# Patient Record
Sex: MALE | Race: WHITE | HISPANIC OR LATINO | Employment: FULL TIME | URBAN - NONMETROPOLITAN AREA
[De-identification: names, ages, dates, MRNs, and addresses within clinical notes are randomized per-mention and may not be internally consistent; named-entity substitution may affect disease eponyms.]

---

## 2023-11-21 ENCOUNTER — PREP FOR PROCEDURE (OUTPATIENT)
Dept: CARDIOLOGY | Facility: CLINIC | Age: 63
End: 2023-11-21
Payer: COMMERCIAL

## 2023-11-21 ENCOUNTER — APPOINTMENT (OUTPATIENT)
Dept: RADIOLOGY | Facility: HOSPITAL | Age: 63
DRG: 281 | End: 2023-11-21
Payer: COMMERCIAL

## 2023-11-21 ENCOUNTER — HOSPITAL ENCOUNTER (INPATIENT)
Facility: HOSPITAL | Age: 63
LOS: 1 days | Discharge: HOSPICE | DRG: 281 | End: 2023-11-22
Attending: STUDENT IN AN ORGANIZED HEALTH CARE EDUCATION/TRAINING PROGRAM | Admitting: INTERNAL MEDICINE
Payer: COMMERCIAL

## 2023-11-21 ENCOUNTER — DOCUMENTATION (OUTPATIENT)
Dept: CARDIOLOGY | Facility: CLINIC | Age: 63
End: 2023-11-21
Payer: COMMERCIAL

## 2023-11-21 DIAGNOSIS — I46.9 CARDIAC ARREST (MULTI): ICD-10-CM

## 2023-11-21 DIAGNOSIS — I21.3 ST ELEVATION MYOCARDIAL INFARCTION (STEMI), UNSPECIFIED ARTERY (MULTI): ICD-10-CM

## 2023-11-21 DIAGNOSIS — M62.81 MUSCLE WEAKNESS (GENERALIZED): ICD-10-CM

## 2023-11-21 DIAGNOSIS — I21.3 STEMI (ST ELEVATION MYOCARDIAL INFARCTION) (MULTI): Primary | ICD-10-CM

## 2023-11-21 LAB
ALBUMIN SERPL BCP-MCNC: 3.3 G/DL (ref 3.4–5)
ALP SERPL-CCNC: 115 U/L (ref 33–136)
ALT SERPL W P-5'-P-CCNC: 42 U/L (ref 10–52)
ANION GAP BLDA CALCULATED.4IONS-SCNC: 7 MMO/L (ref 10–25)
ANION GAP SERPL CALC-SCNC: 15 MMOL/L (ref 10–20)
APPARATUS: ABNORMAL
AST SERPL W P-5'-P-CCNC: 61 U/L (ref 9–39)
BASE EXCESS BLDA CALC-SCNC: 3.8 MMOL/L (ref -2–3)
BASE EXCESS BLDV CALC-SCNC: 2.7 MMOL/L (ref -2–3)
BASOPHILS # BLD AUTO: 0.04 X10*3/UL (ref 0–0.1)
BASOPHILS NFR BLD AUTO: 0.4 %
BILIRUB SERPL-MCNC: 1 MG/DL (ref 0–1.2)
BODY TEMPERATURE: ABNORMAL
BODY TEMPERATURE: ABNORMAL
BUN SERPL-MCNC: 30 MG/DL (ref 6–23)
CA-I BLDA-SCNC: 1.11 MMOL/L (ref 1.1–1.33)
CALCIUM SERPL-MCNC: 8.7 MG/DL (ref 8.6–10.3)
CARDIAC TROPONIN I PNL SERPL HS: 59 NG/L (ref 0–20)
CHLORIDE BLDA-SCNC: 99 MMOL/L (ref 98–107)
CHLORIDE SERPL-SCNC: 95 MMOL/L (ref 98–107)
CO2 SERPL-SCNC: 27 MMOL/L (ref 21–32)
CREAT SERPL-MCNC: 2.19 MG/DL (ref 0.5–1.3)
CRITICAL CALL TIME: 2208
CRITICAL CALLED BY: ABNORMAL
CRITICAL CALLED TO: ABNORMAL
CRITICAL READ BACK: ABNORMAL
EOSINOPHIL # BLD AUTO: 0.06 X10*3/UL (ref 0–0.7)
EOSINOPHIL NFR BLD AUTO: 0.7 %
ERYTHROCYTE [DISTWIDTH] IN BLOOD BY AUTOMATED COUNT: 18.5 % (ref 11.5–14.5)
FLOW: 6 LPM
GFR SERPL CREATININE-BSD FRML MDRD: 33 ML/MIN/1.73M*2
GLUCOSE BLD MANUAL STRIP-MCNC: 214 MG/DL (ref 74–99)
GLUCOSE BLDA-MCNC: 227 MG/DL (ref 74–99)
GLUCOSE SERPL-MCNC: 208 MG/DL (ref 74–99)
HCO3 BLDA-SCNC: 26.5 MMOL/L (ref 22–26)
HCO3 BLDV-SCNC: 27.9 MMOL/L (ref 22–26)
HCT VFR BLD AUTO: 27.9 % (ref 41–52)
HCT VFR BLD EST: 23 % (ref 41–52)
HGB BLD-MCNC: 7.8 G/DL (ref 13.5–17.5)
HGB BLDA-MCNC: 7.6 G/DL (ref 13.5–17.5)
IMM GRANULOCYTES # BLD AUTO: 0.08 X10*3/UL (ref 0–0.7)
IMM GRANULOCYTES NFR BLD AUTO: 0.9 % (ref 0–0.9)
INHALED O2 CONCENTRATION: 21 %
INR PPP: 1.1 (ref 0.9–1.1)
LACTATE BLDA-SCNC: 3.9 MMOL/L (ref 0.4–2)
LYMPHOCYTES # BLD AUTO: 2.42 X10*3/UL (ref 1.2–4.8)
LYMPHOCYTES NFR BLD AUTO: 26.7 %
MAGNESIUM SERPL-MCNC: 2.37 MG/DL (ref 1.6–2.4)
MCH RBC QN AUTO: 19.9 PG (ref 26–34)
MCHC RBC AUTO-ENTMCNC: 28 G/DL (ref 32–36)
MCV RBC AUTO: 71 FL (ref 80–100)
MONOCYTES # BLD AUTO: 1.27 X10*3/UL (ref 0.1–1)
MONOCYTES NFR BLD AUTO: 14 %
NEUTROPHILS # BLD AUTO: 5.21 X10*3/UL (ref 1.2–7.7)
NEUTROPHILS NFR BLD AUTO: 57.3 %
NRBC BLD-RTO: 0 /100 WBCS (ref 0–0)
OXYHGB MFR BLDA: 97.4 % (ref 94–98)
OXYHGB MFR BLDV: 63.7 % (ref 45–75)
PCO2 BLDA: 31 MM HG (ref 38–42)
PCO2 BLDV: 44 MM HG (ref 41–51)
PH BLDA: 7.54 PH (ref 7.38–7.42)
PH BLDV: 7.41 PH (ref 7.33–7.43)
PLATELET # BLD AUTO: 198 X10*3/UL (ref 150–450)
PO2 BLDA: 137 MM HG (ref 85–95)
PO2 BLDV: 44 MM HG (ref 35–45)
POTASSIUM BLDA-SCNC: 2.8 MMOL/L (ref 3.5–5.3)
POTASSIUM SERPL-SCNC: 2.8 MMOL/L (ref 3.5–5.3)
PROT SERPL-MCNC: 6.2 G/DL (ref 6.4–8.2)
PROTHROMBIN TIME: 12.6 SECONDS (ref 9.8–12.8)
RBC # BLD AUTO: 3.91 X10*6/UL (ref 4.5–5.9)
SAO2 % BLDA: 100 % (ref 94–100)
SAO2 % BLDV: 66 % (ref 45–75)
SODIUM BLDA-SCNC: 130 MMOL/L (ref 136–145)
SODIUM SERPL-SCNC: 134 MMOL/L (ref 136–145)
TEST COMMENT: ABNORMAL
WBC # BLD AUTO: 9.1 X10*3/UL (ref 4.4–11.3)

## 2023-11-21 PROCEDURE — 84132 ASSAY OF SERUM POTASSIUM: CPT

## 2023-11-21 PROCEDURE — 99153 MOD SED SAME PHYS/QHP EA: CPT | Performed by: STUDENT IN AN ORGANIZED HEALTH CARE EDUCATION/TRAINING PROGRAM

## 2023-11-21 PROCEDURE — 2720000007 HC OR 272 NO HCPCS: Performed by: STUDENT IN AN ORGANIZED HEALTH CARE EDUCATION/TRAINING PROGRAM

## 2023-11-21 PROCEDURE — C1887 CATHETER, GUIDING: HCPCS | Performed by: STUDENT IN AN ORGANIZED HEALTH CARE EDUCATION/TRAINING PROGRAM

## 2023-11-21 PROCEDURE — 2550000001 HC RX 255 CONTRASTS

## 2023-11-21 PROCEDURE — 99285 EMERGENCY DEPT VISIT HI MDM: CPT | Mod: 25,27 | Performed by: STUDENT IN AN ORGANIZED HEALTH CARE EDUCATION/TRAINING PROGRAM

## 2023-11-21 PROCEDURE — 99285 EMERGENCY DEPT VISIT HI MDM: CPT | Performed by: STUDENT IN AN ORGANIZED HEALTH CARE EDUCATION/TRAINING PROGRAM

## 2023-11-21 PROCEDURE — 94760 N-INVAS EAR/PLS OXIMETRY 1: CPT

## 2023-11-21 PROCEDURE — 93460 R&L HRT ART/VENTRICLE ANGIO: CPT | Performed by: STUDENT IN AN ORGANIZED HEALTH CARE EDUCATION/TRAINING PROGRAM

## 2023-11-21 PROCEDURE — 85025 COMPLETE CBC W/AUTO DIFF WBC: CPT

## 2023-11-21 PROCEDURE — 2500000004 HC RX 250 GENERAL PHARMACY W/ HCPCS (ALT 636 FOR OP/ED): Performed by: STUDENT IN AN ORGANIZED HEALTH CARE EDUCATION/TRAINING PROGRAM

## 2023-11-21 PROCEDURE — 83880 ASSAY OF NATRIURETIC PEPTIDE: CPT

## 2023-11-21 PROCEDURE — 84295 ASSAY OF SERUM SODIUM: CPT

## 2023-11-21 PROCEDURE — C1894 INTRO/SHEATH, NON-LASER: HCPCS | Performed by: STUDENT IN AN ORGANIZED HEALTH CARE EDUCATION/TRAINING PROGRAM

## 2023-11-21 PROCEDURE — 71045 X-RAY EXAM CHEST 1 VIEW: CPT | Mod: FY

## 2023-11-21 PROCEDURE — 2500000005 HC RX 250 GENERAL PHARMACY W/O HCPCS

## 2023-11-21 PROCEDURE — 82805 BLOOD GASES W/O2 SATURATION: CPT

## 2023-11-21 PROCEDURE — 85018 HEMOGLOBIN: CPT

## 2023-11-21 PROCEDURE — 99152 MOD SED SAME PHYS/QHP 5/>YRS: CPT | Performed by: STUDENT IN AN ORGANIZED HEALTH CARE EDUCATION/TRAINING PROGRAM

## 2023-11-21 PROCEDURE — 82435 ASSAY OF BLOOD CHLORIDE: CPT

## 2023-11-21 PROCEDURE — 83735 ASSAY OF MAGNESIUM: CPT

## 2023-11-21 PROCEDURE — 85610 PROTHROMBIN TIME: CPT

## 2023-11-21 PROCEDURE — 2500000004 HC RX 250 GENERAL PHARMACY W/ HCPCS (ALT 636 FOR OP/ED)

## 2023-11-21 PROCEDURE — 82947 ASSAY GLUCOSE BLOOD QUANT: CPT

## 2023-11-21 PROCEDURE — 71045 X-RAY EXAM CHEST 1 VIEW: CPT | Performed by: RADIOLOGY

## 2023-11-21 PROCEDURE — 2500000001 HC RX 250 WO HCPCS SELF ADMINISTERED DRUGS (ALT 637 FOR MEDICARE OP)

## 2023-11-21 PROCEDURE — 36415 COLL VENOUS BLD VENIPUNCTURE: CPT

## 2023-11-21 PROCEDURE — 93010 ELECTROCARDIOGRAM REPORT: CPT | Performed by: STUDENT IN AN ORGANIZED HEALTH CARE EDUCATION/TRAINING PROGRAM

## 2023-11-21 PROCEDURE — 84484 ASSAY OF TROPONIN QUANT: CPT

## 2023-11-21 PROCEDURE — C1751 CATH, INF, PER/CENT/MIDLINE: HCPCS | Performed by: STUDENT IN AN ORGANIZED HEALTH CARE EDUCATION/TRAINING PROGRAM

## 2023-11-21 RX ORDER — LIDOCAINE HYDROCHLORIDE 20 MG/ML
INJECTION, SOLUTION INFILTRATION; PERINEURAL AS NEEDED
Status: DISCONTINUED | OUTPATIENT
Start: 2023-11-21 | End: 2023-11-21 | Stop reason: HOSPADM

## 2023-11-21 RX ORDER — HEPARIN SODIUM 1000 [USP'U]/ML
INJECTION, SOLUTION INTRAVENOUS; SUBCUTANEOUS AS NEEDED
Status: DISCONTINUED | OUTPATIENT
Start: 2023-11-21 | End: 2023-11-21 | Stop reason: HOSPADM

## 2023-11-21 RX ORDER — NITROGLYCERIN 0.4 MG/1
0.4 TABLET SUBLINGUAL ONCE
Status: DISCONTINUED | OUTPATIENT
Start: 2023-11-21 | End: 2023-11-21

## 2023-11-21 RX ORDER — NAPROXEN SODIUM 220 MG/1
324 TABLET, FILM COATED ORAL ONCE
Status: COMPLETED | OUTPATIENT
Start: 2023-11-21 | End: 2023-11-21

## 2023-11-21 RX ORDER — VERAPAMIL HYDROCHLORIDE 2.5 MG/ML
INJECTION, SOLUTION INTRAVENOUS AS NEEDED
Status: DISCONTINUED | OUTPATIENT
Start: 2023-11-21 | End: 2023-11-21 | Stop reason: HOSPADM

## 2023-11-21 RX ADMIN — SODIUM CHLORIDE 1000 ML: 9 INJECTION, SOLUTION INTRAVENOUS at 22:16

## 2023-11-21 RX ADMIN — TICAGRELOR 90 MG: 90 TABLET ORAL at 22:08

## 2023-11-21 RX ADMIN — ASPIRIN 81 MG CHEWABLE TABLET 324 MG: 81 TABLET CHEWABLE at 22:03

## 2023-11-21 ASSESSMENT — LIFESTYLE VARIABLES
HAVE PEOPLE ANNOYED YOU BY CRITICIZING YOUR DRINKING: NO
EVER HAD A DRINK FIRST THING IN THE MORNING TO STEADY YOUR NERVES TO GET RID OF A HANGOVER: NO
EVER FELT BAD OR GUILTY ABOUT YOUR DRINKING: NO
HAVE YOU EVER FELT YOU SHOULD CUT DOWN ON YOUR DRINKING: NO

## 2023-11-21 ASSESSMENT — PAIN DESCRIPTION - PROGRESSION: CLINICAL_PROGRESSION: NOT CHANGED

## 2023-11-21 ASSESSMENT — PAIN - FUNCTIONAL ASSESSMENT: PAIN_FUNCTIONAL_ASSESSMENT: 0-10

## 2023-11-21 ASSESSMENT — COLUMBIA-SUICIDE SEVERITY RATING SCALE - C-SSRS
6. HAVE YOU EVER DONE ANYTHING, STARTED TO DO ANYTHING, OR PREPARED TO DO ANYTHING TO END YOUR LIFE?: NO
1. IN THE PAST MONTH, HAVE YOU WISHED YOU WERE DEAD OR WISHED YOU COULD GO TO SLEEP AND NOT WAKE UP?: NO
2. HAVE YOU ACTUALLY HAD ANY THOUGHTS OF KILLING YOURSELF?: NO

## 2023-11-21 ASSESSMENT — PAIN SCALES - GENERAL
PAINLEVEL_OUTOF10: 0 - NO PAIN

## 2023-11-21 NOTE — Clinical Note
Patient Clipped and Prepped: right groin, right radial and right neck. Prepped with ChloraPrep, a minimum of 3 minute dry time, longer if needed, no pooling noted, patient draped in sterile fashion.

## 2023-11-22 ENCOUNTER — APPOINTMENT (OUTPATIENT)
Dept: RADIOLOGY | Facility: HOSPITAL | Age: 63
DRG: 281 | End: 2023-11-22
Payer: COMMERCIAL

## 2023-11-22 ENCOUNTER — APPOINTMENT (OUTPATIENT)
Dept: CARDIOLOGY | Facility: HOSPITAL | Age: 63
DRG: 281 | End: 2023-11-22
Payer: COMMERCIAL

## 2023-11-22 VITALS
DIASTOLIC BLOOD PRESSURE: 55 MMHG | HEART RATE: 90 BPM | WEIGHT: 229.06 LBS | BODY MASS INDEX: 34.72 KG/M2 | HEIGHT: 68 IN | SYSTOLIC BLOOD PRESSURE: 112 MMHG | RESPIRATION RATE: 18 BRPM | TEMPERATURE: 97.5 F | OXYGEN SATURATION: 94 %

## 2023-11-22 PROBLEM — L97.509 DIABETIC FOOT ULCER (MULTI): Status: ACTIVE | Noted: 2017-12-28

## 2023-11-22 PROBLEM — M48.02 CERVICAL SPINAL STENOSIS: Status: ACTIVE | Noted: 2019-12-16

## 2023-11-22 PROBLEM — E87.6 HYPOKALEMIA: Status: ACTIVE | Noted: 2022-06-30

## 2023-11-22 PROBLEM — K75.81 LIVER CIRRHOSIS SECONDARY TO NASH (MULTI): Status: ACTIVE | Noted: 2018-08-18

## 2023-11-22 PROBLEM — E11.621 DIABETIC FOOT ULCER (MULTI): Status: ACTIVE | Noted: 2017-12-28

## 2023-11-22 PROBLEM — I21.3 STEMI (ST ELEVATION MYOCARDIAL INFARCTION) (MULTI): Status: ACTIVE | Noted: 2023-11-22

## 2023-11-22 PROBLEM — K74.60 LIVER CIRRHOSIS SECONDARY TO NASH (MULTI): Status: ACTIVE | Noted: 2018-08-18

## 2023-11-22 PROBLEM — R49.0 DYSPHONIA: Status: ACTIVE | Noted: 2017-08-24

## 2023-11-22 PROBLEM — N17.9 AKI (ACUTE KIDNEY INJURY) (CMS-HCC): Status: ACTIVE | Noted: 2022-06-30

## 2023-11-22 PROBLEM — E66.01 MORBID OBESITY (MULTI): Status: ACTIVE | Noted: 2017-05-26

## 2023-11-22 PROBLEM — C73 THYROID CANCER (MULTI): Status: ACTIVE | Noted: 2018-08-08

## 2023-11-22 PROBLEM — E86.0 DEHYDRATION: Status: ACTIVE | Noted: 2023-11-22

## 2023-11-22 PROBLEM — L03.116 CELLULITIS OF LEFT FOOT: Status: ACTIVE | Noted: 2022-02-16

## 2023-11-22 PROBLEM — E66.9 OBESITY, CLASS I, BMI 30-34.9: Status: ACTIVE | Noted: 2019-06-11

## 2023-11-22 PROBLEM — K76.6 PORTAL HYPERTENSION (MULTI): Status: ACTIVE | Noted: 2023-06-26

## 2023-11-22 PROBLEM — L81.4 LENTIGINES: Status: ACTIVE | Noted: 2023-06-20

## 2023-11-22 PROBLEM — R05.3 CHRONIC COUGH: Status: ACTIVE | Noted: 2017-08-24

## 2023-11-22 PROBLEM — F41.8 DEPRESSION WITH ANXIETY: Status: ACTIVE | Noted: 2019-07-10

## 2023-11-22 PROBLEM — L97.522: Status: ACTIVE | Noted: 2018-04-02

## 2023-11-22 PROBLEM — I46.9 CARDIAC ARREST (MULTI): Status: ACTIVE | Noted: 2023-02-14

## 2023-11-22 PROBLEM — I10 ESSENTIAL HYPERTENSION: Status: ACTIVE | Noted: 2018-07-29

## 2023-11-22 PROBLEM — I25.10 ARTERIOSCLEROSIS OF CORONARY ARTERY: Status: ACTIVE | Noted: 2018-08-08

## 2023-11-22 PROBLEM — Z95.5 S/P DRUG ELUTING CORONARY STENT PLACEMENT: Status: ACTIVE | Noted: 2022-10-07

## 2023-11-22 PROBLEM — I35.0 NONRHEUMATIC AORTIC VALVE STENOSIS: Status: ACTIVE | Noted: 2023-06-10

## 2023-11-22 PROBLEM — I46.9 CARDIAC ARREST (MULTI): Status: RESOLVED | Noted: 2023-02-14 | Resolved: 2023-11-22

## 2023-11-22 PROBLEM — D63.1 ANEMIA IN STAGE 3B CHRONIC KIDNEY DISEASE (MULTI): Status: ACTIVE | Noted: 2023-03-19

## 2023-11-22 PROBLEM — N18.32 ANEMIA IN STAGE 3B CHRONIC KIDNEY DISEASE (MULTI): Status: ACTIVE | Noted: 2023-03-19

## 2023-11-22 PROBLEM — I50.43 ACUTE ON CHRONIC COMBINED SYSTOLIC AND DIASTOLIC CHF (CONGESTIVE HEART FAILURE) (MULTI): Status: ACTIVE | Noted: 2023-05-16

## 2023-11-22 PROBLEM — E61.1 IRON DEFICIENCY: Status: ACTIVE | Noted: 2023-06-11

## 2023-11-22 PROBLEM — I95.0 IDIOPATHIC HYPOTENSION: Status: ACTIVE | Noted: 2023-02-14

## 2023-11-22 PROBLEM — E11.621: Status: ACTIVE | Noted: 2018-04-02

## 2023-11-22 LAB
ABO GROUP (TYPE) IN BLOOD: NORMAL
ABO GROUP (TYPE) IN BLOOD: NORMAL
ALBUMIN SERPL BCP-MCNC: 2.8 G/DL (ref 3.4–5)
ALP SERPL-CCNC: 93 U/L (ref 33–136)
ALT SERPL W P-5'-P-CCNC: 35 U/L (ref 10–52)
AMMONIA PLAS-SCNC: 35 UMOL/L (ref 16–53)
ANION GAP SERPL CALC-SCNC: 10 MMOL/L (ref 10–20)
ANTIBODY SCREEN: NORMAL
AORTIC VALVE MEAN GRADIENT: 34
AORTIC VALVE PEAK VELOCITY: 3.6
AST SERPL W P-5'-P-CCNC: 47 U/L (ref 9–39)
AV PEAK GRADIENT: 51.8
AVA (PEAK VEL): 1.06
AVA (VTI): 1
BILIRUB SERPL-MCNC: 0.9 MG/DL (ref 0–1.2)
BLOOD EXPIRATION DATE: NORMAL
BNP SERPL-MCNC: 97 PG/ML (ref 0–99)
BUN SERPL-MCNC: 31 MG/DL (ref 6–23)
CALCIUM SERPL-MCNC: 8.1 MG/DL (ref 8.6–10.3)
CARDIAC TROPONIN I PNL SERPL HS: 246 NG/L (ref 0–20)
CARDIAC TROPONIN I PNL SERPL HS: 454 NG/L (ref 0–20)
CHLORIDE SERPL-SCNC: 98 MMOL/L (ref 98–107)
CO2 SERPL-SCNC: 28 MMOL/L (ref 21–32)
CREAT SERPL-MCNC: 2 MG/DL (ref 0.5–1.3)
DISPENSE STATUS: NORMAL
EJECTION FRACTION APICAL 4 CHAMBER: 47.7
EJECTION FRACTION: 53
ERYTHROCYTE [DISTWIDTH] IN BLOOD BY AUTOMATED COUNT: 18.4 % (ref 11.5–14.5)
GFR SERPL CREATININE-BSD FRML MDRD: 37 ML/MIN/1.73M*2
GLUCOSE BLD MANUAL STRIP-MCNC: 201 MG/DL (ref 74–99)
GLUCOSE BLD MANUAL STRIP-MCNC: 212 MG/DL (ref 74–99)
GLUCOSE BLD MANUAL STRIP-MCNC: 229 MG/DL (ref 74–99)
GLUCOSE SERPL-MCNC: 235 MG/DL (ref 74–99)
HCT VFR BLD AUTO: 22.8 % (ref 41–52)
HGB BLD-MCNC: 6.3 G/DL (ref 13.5–17.5)
HGB BLD-MCNC: 7.7 G/DL (ref 13.5–17.5)
LACTATE SERPL-SCNC: 1 MMOL/L (ref 0.4–2)
LEFT VENTRICLE INTERNAL DIMENSION DIASTOLE: 4.8 (ref 3.5–6)
LEFT VENTRICULAR OUTFLOW TRACT DIAMETER: 2
MAGNESIUM SERPL-MCNC: 2.22 MG/DL (ref 1.6–2.4)
MCH RBC QN AUTO: 19.6 PG (ref 26–34)
MCHC RBC AUTO-ENTMCNC: 27.6 G/DL (ref 32–36)
MCV RBC AUTO: 71 FL (ref 80–100)
MITRAL VALVE E/A RATIO: 0.87
MITRAL VALVE E/E' RATIO: 23
NRBC BLD-RTO: 0 /100 WBCS (ref 0–0)
PHOSPHATE SERPL-MCNC: 3.8 MG/DL (ref 2.5–4.9)
PLATELET # BLD AUTO: 152 X10*3/UL (ref 150–450)
POTASSIUM SERPL-SCNC: 3.4 MMOL/L (ref 3.5–5.3)
PRODUCT BLOOD TYPE: 5100
PRODUCT CODE: NORMAL
PROT SERPL-MCNC: 5.2 G/DL (ref 6.4–8.2)
RBC # BLD AUTO: 3.21 X10*6/UL (ref 4.5–5.9)
RH FACTOR (ANTIGEN D): NORMAL
RH FACTOR (ANTIGEN D): NORMAL
RIGHT VENTRICLE FREE WALL PEAK S': 16
SODIUM SERPL-SCNC: 133 MMOL/L (ref 136–145)
T4 FREE SERPL-MCNC: 1.41 NG/DL (ref 0.61–1.12)
TRICUSPID ANNULAR PLANE SYSTOLIC EXCURSION: 2.1
TSH SERPL-ACNC: 0.04 MIU/L (ref 0.44–3.98)
UNIT ABO: NORMAL
UNIT NUMBER: NORMAL
UNIT RH: NORMAL
UNIT VOLUME: 350
WBC # BLD AUTO: 5.9 X10*3/UL (ref 4.4–11.3)
XM INTEP: NORMAL

## 2023-11-22 PROCEDURE — 36415 COLL VENOUS BLD VENIPUNCTURE: CPT

## 2023-11-22 PROCEDURE — 85018 HEMOGLOBIN: CPT

## 2023-11-22 PROCEDURE — 83735 ASSAY OF MAGNESIUM: CPT

## 2023-11-22 PROCEDURE — B2111ZZ FLUOROSCOPY OF MULTIPLE CORONARY ARTERIES USING LOW OSMOLAR CONTRAST: ICD-10-PCS | Performed by: STUDENT IN AN ORGANIZED HEALTH CARE EDUCATION/TRAINING PROGRAM

## 2023-11-22 PROCEDURE — 71250 CT THORAX DX C-: CPT

## 2023-11-22 PROCEDURE — 71250 CT THORAX DX C-: CPT | Performed by: RADIOLOGY

## 2023-11-22 PROCEDURE — 80053 COMPREHEN METABOLIC PANEL: CPT

## 2023-11-22 PROCEDURE — 2500000004 HC RX 250 GENERAL PHARMACY W/ HCPCS (ALT 636 FOR OP/ED)

## 2023-11-22 PROCEDURE — 97162 PT EVAL MOD COMPLEX 30 MIN: CPT | Mod: GP

## 2023-11-22 PROCEDURE — P9016 RBC LEUKOCYTES REDUCED: HCPCS

## 2023-11-22 PROCEDURE — 84484 ASSAY OF TROPONIN QUANT: CPT

## 2023-11-22 PROCEDURE — 93306 TTE W/DOPPLER COMPLETE: CPT | Performed by: INTERNAL MEDICINE

## 2023-11-22 PROCEDURE — 72131 CT LUMBAR SPINE W/O DYE: CPT | Mod: RSC

## 2023-11-22 PROCEDURE — 84443 ASSAY THYROID STIM HORMONE: CPT

## 2023-11-22 PROCEDURE — 82947 ASSAY GLUCOSE BLOOD QUANT: CPT

## 2023-11-22 PROCEDURE — 2500000001 HC RX 250 WO HCPCS SELF ADMINISTERED DRUGS (ALT 637 FOR MEDICARE OP)

## 2023-11-22 PROCEDURE — 72128 CT CHEST SPINE W/O DYE: CPT | Mod: RSC | Performed by: RADIOLOGY

## 2023-11-22 PROCEDURE — 72125 CT NECK SPINE W/O DYE: CPT

## 2023-11-22 PROCEDURE — 70450 CT HEAD/BRAIN W/O DYE: CPT | Performed by: RADIOLOGY

## 2023-11-22 PROCEDURE — 85027 COMPLETE CBC AUTOMATED: CPT

## 2023-11-22 PROCEDURE — 74176 CT ABD & PELVIS W/O CONTRAST: CPT | Performed by: RADIOLOGY

## 2023-11-22 PROCEDURE — 99291 CRITICAL CARE FIRST HOUR: CPT

## 2023-11-22 PROCEDURE — 82140 ASSAY OF AMMONIA: CPT

## 2023-11-22 PROCEDURE — 4A023N8 MEASUREMENT OF CARDIAC SAMPLING AND PRESSURE, BILATERAL, PERCUTANEOUS APPROACH: ICD-10-PCS | Performed by: STUDENT IN AN ORGANIZED HEALTH CARE EDUCATION/TRAINING PROGRAM

## 2023-11-22 PROCEDURE — 36430 TRANSFUSION BLD/BLD COMPNT: CPT

## 2023-11-22 PROCEDURE — 2020000001 HC ICU ROOM DAILY

## 2023-11-22 PROCEDURE — 86900 BLOOD TYPING SEROLOGIC ABO: CPT

## 2023-11-22 PROCEDURE — 84100 ASSAY OF PHOSPHORUS: CPT

## 2023-11-22 PROCEDURE — 86920 COMPATIBILITY TEST SPIN: CPT

## 2023-11-22 PROCEDURE — 93306 TTE W/DOPPLER COMPLETE: CPT

## 2023-11-22 PROCEDURE — 83605 ASSAY OF LACTIC ACID: CPT

## 2023-11-22 PROCEDURE — 72131 CT LUMBAR SPINE W/O DYE: CPT | Mod: RSC | Performed by: RADIOLOGY

## 2023-11-22 PROCEDURE — 70450 CT HEAD/BRAIN W/O DYE: CPT

## 2023-11-22 PROCEDURE — 84439 ASSAY OF FREE THYROXINE: CPT

## 2023-11-22 PROCEDURE — 72128 CT CHEST SPINE W/O DYE: CPT | Mod: RSC

## 2023-11-22 PROCEDURE — 2500000005 HC RX 250 GENERAL PHARMACY W/O HCPCS

## 2023-11-22 PROCEDURE — 99233 SBSQ HOSP IP/OBS HIGH 50: CPT | Performed by: INTERNAL MEDICINE

## 2023-11-22 PROCEDURE — 2500000002 HC RX 250 W HCPCS SELF ADMINISTERED DRUGS (ALT 637 FOR MEDICARE OP, ALT 636 FOR OP/ED)

## 2023-11-22 PROCEDURE — 86850 RBC ANTIBODY SCREEN: CPT

## 2023-11-22 PROCEDURE — 72125 CT NECK SPINE W/O DYE: CPT | Performed by: RADIOLOGY

## 2023-11-22 RX ORDER — ALUMINUM ZIRCONIUM OCTACHLOROHYDREX GLY 16 G/100G
4 GEL TOPICAL DAILY
COMMUNITY

## 2023-11-22 RX ORDER — INSULIN LISPRO 100 [IU]/ML
27 INJECTION, SOLUTION INTRAVENOUS; SUBCUTANEOUS
COMMUNITY

## 2023-11-22 RX ORDER — ROSUVASTATIN CALCIUM 40 MG/1
40 TABLET, COATED ORAL NIGHTLY
COMMUNITY
Start: 2023-04-06

## 2023-11-22 RX ORDER — POTASSIUM CHLORIDE 20 MEQ/15ML
30 SOLUTION ORAL DAILY
COMMUNITY

## 2023-11-22 RX ORDER — GABAPENTIN 100 MG/1
200 CAPSULE ORAL NIGHTLY
COMMUNITY

## 2023-11-22 RX ORDER — POTASSIUM CHLORIDE 1.5 G/1.58G
60 POWDER, FOR SOLUTION ORAL ONCE
Status: COMPLETED | OUTPATIENT
Start: 2023-11-22 | End: 2023-11-22

## 2023-11-22 RX ORDER — DEXTROSE 50 % IN WATER (D50W) INTRAVENOUS SYRINGE
25
Status: DISCONTINUED | OUTPATIENT
Start: 2023-11-22 | End: 2023-11-22 | Stop reason: HOSPADM

## 2023-11-22 RX ORDER — PANTOPRAZOLE SODIUM 40 MG/1
40 TABLET, DELAYED RELEASE ORAL
COMMUNITY

## 2023-11-22 RX ORDER — SODIUM CHLORIDE, SODIUM LACTATE, POTASSIUM CHLORIDE, CALCIUM CHLORIDE 600; 310; 30; 20 MG/100ML; MG/100ML; MG/100ML; MG/100ML
125 INJECTION, SOLUTION INTRAVENOUS CONTINUOUS
Status: DISCONTINUED | OUTPATIENT
Start: 2023-11-22 | End: 2023-11-22 | Stop reason: HOSPADM

## 2023-11-22 RX ORDER — AZELASTINE 1 MG/ML
1 SPRAY, METERED NASAL 2 TIMES DAILY PRN
COMMUNITY

## 2023-11-22 RX ORDER — HEPARIN SODIUM 5000 [USP'U]/ML
5000 INJECTION, SOLUTION INTRAVENOUS; SUBCUTANEOUS EVERY 8 HOURS
Status: DISCONTINUED | OUTPATIENT
Start: 2023-11-22 | End: 2023-11-22 | Stop reason: HOSPADM

## 2023-11-22 RX ORDER — MONTELUKAST SODIUM 10 MG/1
10 TABLET ORAL NIGHTLY
COMMUNITY

## 2023-11-22 RX ORDER — TORSEMIDE 20 MG/1
20 TABLET ORAL 2 TIMES DAILY
COMMUNITY
Start: 2023-04-06

## 2023-11-22 RX ORDER — LIDOCAINE 560 MG/1
2 PATCH PERCUTANEOUS; TOPICAL; TRANSDERMAL DAILY
Status: DISCONTINUED | OUTPATIENT
Start: 2023-11-22 | End: 2023-11-22 | Stop reason: HOSPADM

## 2023-11-22 RX ORDER — DOCUSATE SODIUM 100 MG/1
100 CAPSULE, LIQUID FILLED ORAL 2 TIMES DAILY PRN
COMMUNITY

## 2023-11-22 RX ORDER — INSULIN LISPRO 100 [IU]/ML
24 INJECTION, SOLUTION INTRAVENOUS; SUBCUTANEOUS
COMMUNITY

## 2023-11-22 RX ORDER — EZETIMIBE 10 MG/1
10 TABLET ORAL DAILY
COMMUNITY

## 2023-11-22 RX ORDER — TAMSULOSIN HYDROCHLORIDE 0.4 MG/1
0.4 CAPSULE ORAL NIGHTLY
COMMUNITY

## 2023-11-22 RX ORDER — LEVOTHYROXINE SODIUM 150 UG/1
150 TABLET ORAL
COMMUNITY

## 2023-11-22 RX ORDER — DOCUSATE SODIUM 100 MG/1
100 CAPSULE, LIQUID FILLED ORAL 2 TIMES DAILY
Status: DISCONTINUED | OUTPATIENT
Start: 2023-11-22 | End: 2023-11-22 | Stop reason: HOSPADM

## 2023-11-22 RX ORDER — INSULIN GLARGINE 300 U/ML
96 INJECTION, SOLUTION SUBCUTANEOUS DAILY
COMMUNITY

## 2023-11-22 RX ORDER — MIDODRINE HYDROCHLORIDE 5 MG/1
15 TABLET ORAL 3 TIMES DAILY PRN
COMMUNITY
Start: 2023-07-02

## 2023-11-22 RX ORDER — TIRZEPATIDE 5 MG/.5ML
5 INJECTION, SOLUTION SUBCUTANEOUS
COMMUNITY

## 2023-11-22 RX ORDER — SENNOSIDES 8.6 MG/1
1 TABLET ORAL NIGHTLY
COMMUNITY

## 2023-11-22 RX ORDER — LEVOTHYROXINE SODIUM 150 UG/1
75 TABLET ORAL
COMMUNITY

## 2023-11-22 RX ORDER — POTASSIUM CHLORIDE 14.9 MG/ML
20 INJECTION INTRAVENOUS
Status: COMPLETED | OUTPATIENT
Start: 2023-11-22 | End: 2023-11-22

## 2023-11-22 RX ORDER — INSULIN LISPRO 100 [IU]/ML
0-10 INJECTION, SOLUTION INTRAVENOUS; SUBCUTANEOUS EVERY 4 HOURS
Status: DISCONTINUED | OUTPATIENT
Start: 2023-11-22 | End: 2023-11-22 | Stop reason: HOSPADM

## 2023-11-22 RX ORDER — POTASSIUM CHLORIDE 1.5 G/1.58G
20 POWDER, FOR SOLUTION ORAL ONCE
Status: COMPLETED | OUTPATIENT
Start: 2023-11-22 | End: 2023-11-22

## 2023-11-22 RX ORDER — INSULIN LISPRO 100 [IU]/ML
40 INJECTION, SOLUTION INTRAVENOUS; SUBCUTANEOUS EVERY MORNING
COMMUNITY

## 2023-11-22 RX ORDER — LIDOCAINE 560 MG/1
1 PATCH PERCUTANEOUS; TOPICAL; TRANSDERMAL DAILY PRN
Status: DISCONTINUED | OUTPATIENT
Start: 2023-11-22 | End: 2023-11-22 | Stop reason: HOSPADM

## 2023-11-22 RX ORDER — DEXTROSE MONOHYDRATE 100 MG/ML
0.3 INJECTION, SOLUTION INTRAVENOUS ONCE AS NEEDED
Status: DISCONTINUED | OUTPATIENT
Start: 2023-11-22 | End: 2023-11-22 | Stop reason: HOSPADM

## 2023-11-22 RX ORDER — POTASSIUM CHLORIDE 20 MEQ/1
60 TABLET, EXTENDED RELEASE ORAL ONCE
Status: DISCONTINUED | OUTPATIENT
Start: 2023-11-22 | End: 2023-11-22

## 2023-11-22 RX ADMIN — POTASSIUM CHLORIDE 20 MEQ: 1.5 POWDER, FOR SOLUTION ORAL at 09:31

## 2023-11-22 RX ADMIN — INSULIN LISPRO 4 UNITS: 100 INJECTION, SOLUTION INTRAVENOUS; SUBCUTANEOUS at 11:52

## 2023-11-22 RX ADMIN — PERFLUTREN 10 ML OF DILUTION: 6.52 INJECTION, SUSPENSION INTRAVENOUS at 07:47

## 2023-11-22 RX ADMIN — MIDODRINE HYDROCHLORIDE 15 MG: 10 TABLET ORAL at 09:31

## 2023-11-22 RX ADMIN — INSULIN LISPRO 4 UNITS: 100 INJECTION, SOLUTION INTRAVENOUS; SUBCUTANEOUS at 03:15

## 2023-11-22 RX ADMIN — POTASSIUM CHLORIDE 20 MEQ: 14.9 INJECTION, SOLUTION INTRAVENOUS at 11:31

## 2023-11-22 RX ADMIN — POTASSIUM CHLORIDE 20 MEQ: 14.9 INJECTION, SOLUTION INTRAVENOUS at 09:32

## 2023-11-22 RX ADMIN — SODIUM CHLORIDE, POTASSIUM CHLORIDE, SODIUM LACTATE AND CALCIUM CHLORIDE 125 ML/HR: 600; 310; 30; 20 INJECTION, SOLUTION INTRAVENOUS at 03:16

## 2023-11-22 RX ADMIN — LIDOCAINE 2 PATCH: 4 PATCH TOPICAL at 11:33

## 2023-11-22 RX ADMIN — INSULIN LISPRO 4 UNITS: 100 INJECTION, SOLUTION INTRAVENOUS; SUBCUTANEOUS at 09:31

## 2023-11-22 RX ADMIN — POTASSIUM CHLORIDE 60 MEQ: 1.5 POWDER, FOR SOLUTION ORAL at 02:48

## 2023-11-22 RX ADMIN — DOCUSATE SODIUM 100 MG: 100 CAPSULE, LIQUID FILLED ORAL at 09:31

## 2023-11-22 SDOH — SOCIAL STABILITY: SOCIAL INSECURITY: HAS ANYONE EVER THREATENED TO HURT YOUR FAMILY OR YOUR PETS?: NO

## 2023-11-22 SDOH — SOCIAL STABILITY: SOCIAL INSECURITY: ARE YOU OR HAVE YOU BEEN THREATENED OR ABUSED PHYSICALLY, EMOTIONALLY, OR SEXUALLY BY ANYONE?: NO

## 2023-11-22 SDOH — SOCIAL STABILITY: SOCIAL INSECURITY: HAVE YOU HAD THOUGHTS OF HARMING ANYONE ELSE?: YES

## 2023-11-22 SDOH — SOCIAL STABILITY: SOCIAL INSECURITY: DO YOU FEEL UNSAFE GOING BACK TO THE PLACE WHERE YOU ARE LIVING?: NO

## 2023-11-22 SDOH — SOCIAL STABILITY: SOCIAL INSECURITY: DO YOU FEEL ANYONE HAS EXPLOITED OR TAKEN ADVANTAGE OF YOU FINANCIALLY OR OF YOUR PERSONAL PROPERTY?: NO

## 2023-11-22 SDOH — SOCIAL STABILITY: SOCIAL INSECURITY: ARE THERE ANY APPARENT SIGNS OF INJURIES/BEHAVIORS THAT COULD BE RELATED TO ABUSE/NEGLECT?: NO

## 2023-11-22 SDOH — SOCIAL STABILITY: SOCIAL INSECURITY: DOES ANYONE TRY TO KEEP YOU FROM HAVING/CONTACTING OTHER FRIENDS OR DOING THINGS OUTSIDE YOUR HOME?: NO

## 2023-11-22 SDOH — SOCIAL STABILITY: SOCIAL INSECURITY: WERE YOU ABLE TO COMPLETE ALL THE BEHAVIORAL HEALTH SCREENINGS?: YES

## 2023-11-22 SDOH — SOCIAL STABILITY: SOCIAL INSECURITY: ABUSE: ADULT

## 2023-11-22 ASSESSMENT — ACTIVITIES OF DAILY LIVING (ADL)
GROOMING: INDEPENDENT
DRESSING YOURSELF: INDEPENDENT
HEARING - RIGHT EAR: HEARING AID
TOILETING: INDEPENDENT
HEARING - LEFT EAR: HEARING AID
JUDGMENT_ADEQUATE_SAFELY_COMPLETE_DAILY_ACTIVITIES: YES
BATHING: INDEPENDENT
FEEDING YOURSELF: INDEPENDENT
LACK_OF_TRANSPORTATION: NO
ASSISTIVE_DEVICE: CANE;HEARING AID - RIGHT;HEARING AID - LEFT
ADEQUATE_TO_COMPLETE_ADL: YES
PATIENT'S MEMORY ADEQUATE TO SAFELY COMPLETE DAILY ACTIVITIES?: YES
WALKS IN HOME: INDEPENDENT

## 2023-11-22 ASSESSMENT — LIFESTYLE VARIABLES
AUDIT-C TOTAL SCORE: 1
PRESCIPTION_ABUSE_PAST_12_MONTHS: NO
SKIP TO QUESTIONS 9-10: 1
HOW MANY STANDARD DRINKS CONTAINING ALCOHOL DO YOU HAVE ON A TYPICAL DAY: 1 OR 2
HOW OFTEN DO YOU HAVE A DRINK CONTAINING ALCOHOL: MONTHLY OR LESS
SUBSTANCE_ABUSE_PAST_12_MONTHS: NO
HOW OFTEN DO YOU HAVE 6 OR MORE DRINKS ON ONE OCCASION: NEVER
AUDIT-C TOTAL SCORE: 1

## 2023-11-22 ASSESSMENT — ENCOUNTER SYMPTOMS
COUGH: 1
ACTIVITY CHANGE: 0
ABDOMINAL DISTENTION: 1
FEVER: 0
VOMITING: 0
ABDOMINAL PAIN: 0
DIARRHEA: 0
NAUSEA: 0
SHORTNESS OF BREATH: 1
APPETITE CHANGE: 0
BACK PAIN: 1
CONSTIPATION: 1
LIGHT-HEADEDNESS: 1

## 2023-11-22 ASSESSMENT — PAIN - FUNCTIONAL ASSESSMENT
PAIN_FUNCTIONAL_ASSESSMENT: 0-10

## 2023-11-22 ASSESSMENT — COGNITIVE AND FUNCTIONAL STATUS - GENERAL
WALKING IN HOSPITAL ROOM: TOTAL
DRESSING REGULAR UPPER BODY CLOTHING: A LITTLE
STANDING UP FROM CHAIR USING ARMS: TOTAL
MOVING TO AND FROM BED TO CHAIR: TOTAL
STANDING UP FROM CHAIR USING ARMS: TOTAL
HELP NEEDED FOR BATHING: A LITTLE
PATIENT BASELINE BEDBOUND: NO
MOBILITY SCORE: 8
WALKING IN HOSPITAL ROOM: TOTAL
MOVING FROM LYING ON BACK TO SITTING ON SIDE OF FLAT BED WITH BEDRAILS: A LITTLE
MOVING FROM LYING ON BACK TO SITTING ON SIDE OF FLAT BED WITH BEDRAILS: A LITTLE
PERSONAL GROOMING: A LITTLE
CLIMB 3 TO 5 STEPS WITH RAILING: TOTAL
TOILETING: A LITTLE
MOBILITY SCORE: 8
CLIMB 3 TO 5 STEPS WITH RAILING: TOTAL
MOVING TO AND FROM BED TO CHAIR: TOTAL
TURNING FROM BACK TO SIDE WHILE IN FLAT BAD: TOTAL
DAILY ACTIVITIY SCORE: 19
DRESSING REGULAR LOWER BODY CLOTHING: A LITTLE
MOBILITY SCORE: 24
DAILY ACTIVITIY SCORE: 24
TURNING FROM BACK TO SIDE WHILE IN FLAT BAD: TOTAL

## 2023-11-22 ASSESSMENT — PATIENT HEALTH QUESTIONNAIRE - PHQ9
1. LITTLE INTEREST OR PLEASURE IN DOING THINGS: NOT AT ALL
SUM OF ALL RESPONSES TO PHQ9 QUESTIONS 1 & 2: 0
2. FEELING DOWN, DEPRESSED OR HOPELESS: NOT AT ALL

## 2023-11-22 ASSESSMENT — PAIN SCALES - GENERAL
PAINLEVEL_OUTOF10: 0 - NO PAIN

## 2023-11-22 NOTE — NURSING NOTE
Patient discharged at time of 1353 to [facility] bed 25 via physician's ambulance. Report previously called to [facility]. [facility] called to update on delayed transfer. Patient's wife previously notified of transfer. Patient's wife took belongings home except L hearing aide and cell phone to remain with patient

## 2023-11-22 NOTE — DISCHARGE SUMMARY
Discharge Diagnosis  Cardiac arrest (CMS/HCC)    Issues Requiring Follow-Up  Patient is transferring care to [place].  Echo has been performed at [facility] on 11/22 this study is currently pending a reading.    Discharge Meds     Your medication list        CONTINUE taking these medications        Instructions Last Dose Given Next Dose Due   Align 4 mg capsule  Generic drug: Bifidobacterium infantis           azelastine 137 mcg (0.1 %) nasal spray  Commonly known as: Astelin           docusate sodium 100 mg capsule  Commonly known as: Colace           ezetimibe 10 mg tablet  Commonly known as: Zetia           gabapentin 100 mg capsule  Commonly known as: Neurontin           insulin lispro 100 unit/mL injection  Commonly known as: HumaLOG           insulin lispro 100 unit/mL injection  Commonly known as: HumaLOG           insulin lispro 100 unit/mL injection  Commonly known as: HumaLOG           levothyroxine 150 mcg tablet  Commonly known as: Synthroid, Levoxyl           levothyroxine 150 mcg tablet  Commonly known as: Synthroid, Levoxyl           midodrine 5 mg tablet  Commonly known as: Proamatine           montelukast 10 mg tablet  Commonly known as: Singulair           Mounjaro 5 mg/0.5 mL pen injector  Generic drug: tirzepatide           pantoprazole 40 mg EC tablet  Commonly known as: ProtoNix           potassium chloride 20 mEq/15 mL liquid           rosuvastatin 40 mg tablet  Commonly known as: Crestor           sennosides 8.6 mg tablet  Commonly known as: Senokot           tamsulosin 0.4 mg 24 hr capsule  Commonly known as: Flomax           ticagrelor 90 mg tablet  Commonly known as: Brilinta           torsemide 20 mg tablet  Commonly known as: Demadex           Toujeo SoloStar U-300 Insulin 300 unit/mL (1.5 mL) injection  Generic drug: insulin glargine                    Test Results Pending At Discharge  Pending Labs       No current pending labs.            Hospital Course  Bob  Jerry is a 62 y.o. male with PMH of cirrhosis 2/2 YEN, iron deficiency anemia, cardiac arrest 2/23, moderate AS, CAD s/p multiple stents (LAD and OM1), HFpEF, left foot diabetic wound s/p debridement/skin graft, thyroid CA s/p thyroidectomy c/b vocal cord paralysis, T2DM, neuropathy, dyslipidemia, ANNMARIE, hypotension on midodrine and CKD3 who presented to [facility] ED after cardiac arrest.  Approximately 30 minutes prior to EMS arrival at their home the patient had fallen.  His wife heard the fall and checked on him and found him pulseless and began doing 6 rounds of CPR until EMS showed up and performed 1 round and they achieved ROSC.  An airway was attempted but the patient had gag and it was not continued.  When he fell it was assumed he hit his head elbow and knees as bruises are present.  Imaging was negative for any acute bleed.  The patient stated he was feeling symptoms of anemia such as lightheadedness and dizziness prior to the fall but does not recall the fall itself.  It is noted his last episode of cardiac arrest was 2/23 he was put on GDMT after receiving 2 stents and mentation has been ANO x3 since then however repetitive.  Upon arrival to the emergency department cardiology was consulted and patient was taken to the Cath Lab.  Troponin peaked at 454 but EKG did not show ST elevations, just ST depressions in the anterior lateral wall. LHC/RHC completed: patent coronaries and stents, no intervention, LV ~50%, low filling pressure consistent with dehydration -PWP 6, PA 32/10 (20), RV 42/0/5, RA 5mmHg; LVeFP 9mmHg.  Cardiology recommends GDMT. Hemoglobin was 6.3 and patient was given 1 unit of blood and improved to 7.7.  An echo was performed and is currently pending.    During imaging studies it was noted the contrast IV had infiltrated the patient's left arm.  This is likely the cause of the presentation of the swelling seen in addition to IO access in the left shoulder.     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    Pertinent Physical Exam At Time of Discharge  Physical Exam  Constitutional:       Appearance: Normal appearance.   Cardiovascular:      Rate and Rhythm: Regular rhythm. Tachycardia present.      Heart sounds: Normal heart sounds.   Pulmonary:      Effort: Pulmonary effort is normal.      Breath sounds: Normal breath sounds.      Comments: 3 L nasal cannula in use  Chest:      Comments: Midsternal tenderness  Abdominal:      General: Bowel sounds are normal.      Palpations: Abdomen is soft.   Musculoskeletal:      Comments: Swelling and blistering of the left upper extremity;   Neurological:      General: No focal deficit present.      Mental Status: He is alert and oriented to person, place, and time.   Psychiatric:         Mood and Affect: Mood normal.         Behavior: Behavior normal.         Thought Content: Thought content normal.         Judgment: Judgment normal.         Outpatient Follow-Up  No future appointments.      Janae Houston, DO

## 2023-11-22 NOTE — CARE PLAN
Problem: Fall/Injury  Goal: Not fall by end of shift  Outcome: Progressing  Goal: Be free from injury by end of the shift  Outcome: Progressing  Goal: Use assistive devices by end of the shift  Outcome: Progressing     Problem: Pain  Goal: My pain/discomfort is manageable  Outcome: Progressing     Problem: Safety  Goal: I will remain free of falls  Outcome: Progressing     Problem: Daily Care  Goal: Daily care needs are met  Outcome: Progressing     Problem: Psychosocial Needs  Goal: Demonstrates ability to cope with hospitalization/illness  Outcome: Progressing  Goal: Collaborate with me, my family, and caregiver to identify my specific goals  Outcome: Progressing  Flowsheets (Taken 11/22/2023 0202)  Cultural Requests During Hospitalization: none  Spiritual Requests During Hospitalization: none     Problem: Pain  Goal: Turns in bed with improved pain control throughout the shift  Outcome: Progressing     Problem: Skin  Goal: Decreased wound size/increased tissue granulation at next dressing change  Outcome: Progressing  Flowsheets (Taken 11/22/2023 0343)  Decreased wound size/increased tissue granulation at next dressing change: Promote sleep for wound healing  Goal: Prevent/manage excess moisture  Outcome: Progressing  Flowsheets (Taken 11/22/2023 0343)  Prevent/manage excess moisture:   Monitor for/manage infection if present   Cleanse incontinence/protect with barrier cream  Goal: Promote/optimize nutrition  Outcome: Progressing  Flowsheets (Taken 11/22/2023 0343)  Promote/optimize nutrition: Monitor/record intake including meals  Goal: Promote skin healing  Outcome: Progressing  Flowsheets (Taken 11/22/2023 0343)  Promote skin healing:   Turn/reposition every 2 hours/use positioning/transfer devices   Rotate device position/do not position patient on device

## 2023-11-22 NOTE — HOSPITAL COURSE
Bob Edwards is a 62 y.o. male with PMH of cirrhosis 2/2 YEN, iron deficiency anemia, cardiac arrest 2/23, moderate AS, CAD s/p multiple stents (LAD and OM1), HFpEF, left foot diabetic wound s/p debridement/skin graft, thyroid CA s/p thyroidectomy c/b vocal cord paralysis, T2DM, neuropathy, dyslipidemia, ANNMARIE, hypotension on midodrine and CKD3 who presented to [facility] ED after cardiac arrest.  Approximately 30 minutes prior to EMS arrival at their home the patient had fallen.  His wife heard the fall and checked on him and found him pulseless and began doing 6 rounds of CPR until EMS showed up and performed 1 round and they achieved ROSC.  An airway was attempted but the patient had gag and it was not continued.  When he fell it was assumed he hit his head elbow and knees as bruises are present.  Imaging was negative for any acute bleed.  The patient stated he was feeling symptoms of anemia such as lightheadedness and dizziness prior to the fall but does not recall the fall itself.  It is noted his last episode of cardiac arrest was 2/23 he was put on GDMT after receiving 2 stents and mentation has been ANO x3 since then however repetitive.  Upon arrival to the emergency department cardiology was consulted and patient was taken to the Cath Lab.  Troponin peaked at 454 but EKG did not show ST elevations, just ST depressions in the anterior lateral wall. LHC/RHC completed: patent coronaries and stents, no intervention, LV ~50%, low filling pressure consistent with dehydration -PWP 6, PA 32/10 (20), RV 42/0/5, RA 5mmHg; LVeFP 9mmHg.  Cardiology recommends GDMT. Hemoglobin was 6.3 and patient was given 1 unit of blood and improved to 7.7.  An echo was performed and is currently pending.    During imaging studies it was noted the contrast IV had infiltrated the patient's left arm.  This is likely the cause of the presentation of the swelling seen in addition to IO access in the left shoulder.     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

## 2023-11-22 NOTE — SIGNIFICANT EVENT
CT C/A/P,L-spine/T-spine ordered due to drop in hemoglobin, s/p arrest with fall on antiplatelet therapy, back pain, spouse reports more abdominal distention than baseline, IV infiltrated at CT and pt with CKD3 so CT completed without contrast. IV removed, apply warm compress.

## 2023-11-22 NOTE — ED PROVIDER NOTES
HPI: Patient is 62-year-old male with history of DM 2, prior MI with stents placed presenting as a full arrest.  Likely half an hour prior to presentation, patient went to cardiac arrest.  By the time EMS arrived at his home, patient's family were performing chest compressions.  EMS resumed compressions, doing 1 round before achieving ROSC.  They reportedly attempt placed an oral airway but patient gagged and became alert and oriented x1.  At presentation, patient is complaining of chest pain but no other symptoms.    Review of Systems:  CONSTITUTIONAL: Denies fever, sweats, chills.  NEURO: Denies difficulty walking, numbness, weakness, tingling, headache.  HEENT: Denies sore throat, rhinorrhea, epistaxis, changes in vision.  CARDIO: Reports chest pain.  Denies palpitations.  PULM: Denies shortness of breath, cough.  GI: Denies abdominal pain, nausea, vomiting, diarrhea, constipation, melena, hematochezia.  : Denies painful urination, frequency, hematuria.  MSK: Denies recent trauma.  SKIN: Denies rash, lesions.  ENDOCRINE: Denies unexpected weight-loss.  HEME: Denies bleeding disorder.  ------------------------------------------------------------  Past medical history: DM2, MI  Past surgical history: Reviewed  Social history: Denies tobacco use, alcohol use, recreational drug usage  ALLERGIES: As documented in EMR were reviewed and discussed with patient.  ------------------------------------------------------------  Physical exam:  VS: As documented in the triage note from today's date and EMR flowsheet were reviewed.  Gen: Lethargic. Seated in bed.  Skin: Warm. Diaphoretic. Intact. No rashes or lesions.  Eyes: Pupils equally round and reactive to light. Clear sclera. EOMI.  HENT: Atraumatic appearance. Mucosal membranes moist.  CV: Regular rate and rhythm. S1, S2. No pedal edema. Warm extremities.  Resp: Nonlabored breathing Clear to auscultation bilaterally.  GI: Soft and nontender. No rebound or  guarding.  MSK: Symmetric muscle bulk. No joint swelling in the extremities.  Neuro: Alert and oriented to person and place only. Speech sluggish. Moving all extremities.  ------------------------------------------------------------  Hospital Course / Medical Decision Making:  History obtained with the patient and EMS.  Records including labs, imaging, notes reviewed.  ROSC was achieved prior to presentation to the hospital.  Patient's mentation gradually improved during his time in the emergency department.  Patient was immediate placed on the monitor and large-bore IV access was obtained.  Patient already had an IO in his left shoulder.  Interventional cardiology was consulted immediately who called for the Cath Lab to be prepped.  In the interim, EKG demonstrated ST depressions in 2, 3, aVF.  Patient was given full dose aspirin and Brilinta further recommendations.  Basic and cardiac labs were sent which were pending at the time of transport to the Cath Lab.    I reviewed the case with the attending ED physician. The attending ED physician agrees with the plan. Patient was counseled regarding labs, imaging, likely diagnosis, and plan. All questions were answered.     Basilio Vergara MD  Resident  11/23/23 [id number]

## 2023-11-22 NOTE — DISCHARGE INSTRUCTIONS
Patient is being discharged to another facility.  Receiving facility will provide further discharge recommendations.

## 2023-11-22 NOTE — H&P
History Of Present Illness  Bob Edwards is a 62 y.o. 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 She states she did about 6 rounds of CPR before EMS arrived who achieved ROSC prior to patient being placed on monitor. Patient was alert on arrival to the ED. He complains of chronic back pain at this time, denies chest pain. She reports he did hit his head, right elbow and knees as there is new bruising present. He denies knee pain. He is A&Ox3 but repetitive, spouse reports he was similar after his last cardiac arrest. He also reports he had a cardiologist appointment earlier today (11/21) at [facility] and was told to discontinue his asa due to chronic MIGUEL. He reports he has been eating and drinking okay, no nausea, vomiting or diarrhea, he did just finish antibiotics Monday for a sinus infection. He reports mild constipation and exertional chest pain, shortness of breath, and light headedness yesterday that resolved after about 2 minutes of rest. He reports he felt those symptoms were from his anemia as the pain did not radiate and did not feel when he has had cardiac issues in the past.    ED course: Initial vitals- , RR 19, /60, SPO2 99%. EKG sinus tachycardia, nonspecific ST depressions in hafsa-lateral leads. Labs significant for hyperglycemia 208, hypokalemia 2.8, BUN/Cr 30/2.19, AST 61, troponin 59, chronic anemia H/H 7.8/27.9, . ABG showed respiratory alkalosis with ph 7.54, pCO2 31, pO2 137 (on nasal cannula), lactate 3.9. CXR showed small left pleural  effusion. Interventional cardiology was consulted and recommended patient go to the cath lab. Patient was given asa, Brilinta, for suspected STEMI and 1L NS for soft BP. He was taken to the cath lab by interventional cardiology.     In cath lab, patient underwent RHC via RIJ with Lonsdale-Angela placement and LHC via R radial, patent LAD and OM1 stents, patient coronaries, LVEF about 50%, low filling pressures consistent with dehydration, multivessel non-obstructive CAD, no interventions needed. PWP 6, PA 32/10 (20), RV 42/0/5, RA 5mmHg; LVeFP 9mmHg via Lonsdale-Angela. VBG showed ph 7.41, pCO2 44. Cardiology recommended GDMT.     Patient was admitted to ICU for close hemodynamic monitoring s/p cardiac arrest. He would like to be transferred to [facility] as he is a nurse at [employer] and follows with a cardiologist there. Transfer line was called.     Past Medical History  Past Medical History:   Diagnosis Date    Acute on chronic combined systolic and diastolic CHF (congestive heart failure) (CMS/[facility]) 05/16/2023    Anemia in stage 3b chronic kidney disease (CMS/[facility]) 03/19/2023    Arteriosclerosis of coronary artery 08/08/2018    Background diabetic retinopathy (CMS/[facility]) 12/08/2007    Benign prostatic hyperplasia 02/23/2011    Last Assessment & Plan: Formatting of this note might be different from the original. Assessment: on tamsulosin PLAN: Currently with saldivar catheter    Cardiac arrest (CMS/[facility])     Chronic cough 08/24/2017    Diabetes mellitus (CMS/[facility])     Diabetic ulcer of left foot associated with type 2 diabetes mellitus, with fat layer exposed (CMS/[facility]) 04/02/2018    Dysphonia 08/24/2017    Hearing difficulty 07/10/2013    Hypokalemia 06/30/2022    Idiopathic hypotension 02/14/2023    Last Assessment & Plan: Formatting of this note might be different from the original. Assessment: on midodrine at home PLAN: continue    Iron deficiency 06/11/2023    Liver cirrhosis secondary to YEN (CMS/[facility]) 08/18/2018    Lumbago  "03/30/2010    Mixed hyperlipidemia 10/05/2002    Morbid obesity (CMS/HCC) 05/26/2017    Nonrheumatic aortic valve stenosis 06/10/2023    Obesity, Class I, BMI 30-34.9 06/11/2019    ANNMARIE on CPAP 03/16/2007    Portal hypertension (CMS/HCC) 06/26/2023    S/P drug eluting coronary stent placement 10/07/2022    Last Assessment & Plan: Formatting of this note might be different from the original. See \"NICOLE\" S/p DESx3 (due to recoiling and c/f stent fracture) to OM1.    Stage 3 chronic kidney disease (CMS/HCC) 07/13/2009    Thyroid cancer (CMS/HCC) 08/08/2018    Type 2 diabetes mellitus with skin complication (CMS/HCC) 03/21/2016       Surgical History  Past Surgical History:   Procedure Laterality Date    HERNIA REPAIR      inguinal    LITHOTRIPSY      THYROIDECTOMY          Social History  He reports that he has never smoked. He has never used smokeless tobacco. He reports that he does not drink alcohol and does not use drugs.    Family History  No family history on file.     Allergies  Penicillins, Statins-hmg-coa reductase inhibitors, and Sulfa (sulfonamide antibiotics)    Review of Systems   Constitutional:  Negative for activity change, appetite change and fever.   Respiratory:  Positive for cough and shortness of breath.    Cardiovascular:  Positive for chest pain.        Denies CP at this time, had exertional chest pain yesterday   Gastrointestinal:  Positive for abdominal distention and constipation. Negative for abdominal pain, diarrhea, nausea and vomiting.   Musculoskeletal:  Positive for back pain.   Neurological:  Positive for light-headedness.        Physical Exam  Constitutional:       General: He is not in acute distress.     Appearance: He is obese.   HENT:      Head: Normocephalic.      Comments: Ecchymosis R temporal     Mouth/Throat:      Mouth: Mucous membranes are dry.   Eyes:      Extraocular Movements: Extraocular movements intact.      Conjunctiva/sclera: Conjunctivae normal.      Pupils: Pupils are " "equal, round, and reactive to light.   Cardiovascular:      Rate and Rhythm: Normal rate and regular rhythm.      Pulses: Normal pulses.      Heart sounds: Murmur heard.   Pulmonary:      Effort: Pulmonary effort is normal. No respiratory distress.      Breath sounds: Normal breath sounds. No wheezing or rhonchi.   Abdominal:      General: Bowel sounds are normal. There is distension.      Palpations: Abdomen is soft.      Tenderness: There is no abdominal tenderness. There is no guarding.   Musculoskeletal:         General: No swelling. Normal range of motion.      Cervical back: Normal range of motion and neck supple.      Comments: Boot to L foot for chronic wound   Skin:     General: Skin is warm and dry.      Coloration: Skin is pale.      Comments: Ecchymosis to R knee, L knee, and R elbow   Neurological:      General: No focal deficit present.      Mental Status: He is alert and oriented to person, place, and time.   Psychiatric:         Mood and Affect: Mood normal.          Last Recorded Vitals  Blood pressure 119/51, pulse 100, temperature 36.7 °C (98.1 °F), temperature source Temporal, resp. rate 20, height 1.727 m (5' 8\"), weight 100 kg (220 lb 7.4 oz), SpO2 98 %.    Relevant Results  Scheduled medications  docusate sodium, 100 mg, oral, BID  [Held by provider] heparin (porcine), 5,000 Units, subcutaneous, q8h  insulin lispro, 0-10 Units, subcutaneous, q4h  lactated Ringer's, 500 mL, intravenous, Once  midodrine, 15 mg, oral, TID  potassium chloride, 20 mEq, oral, Once      Continuous medications  lactated Ringer's, 125 mL/hr, Last Rate: 125 mL/hr (11/22/23 0316)      PRN medications  PRN medications: dextrose 10 % in water (D10W), dextrose, glucagon, lidocaine, oxygen  Results for orders placed or performed during the hospital encounter of 11/21/23 (from the past 24 hour(s))   POCT GLUCOSE   Result Value Ref Range    POCT Glucose 214 (H) 74 - 99 mg/dL   CBC and Auto Differential   Result Value Ref Range "    WBC 9.1 4.4 - 11.3 x10*3/uL    nRBC 0.0 0.0 - 0.0 /100 WBCs    RBC 3.91 (L) 4.50 - 5.90 x10*6/uL    Hemoglobin 7.8 (L) 13.5 - 17.5 g/dL    Hematocrit 27.9 (L) 41.0 - 52.0 %    MCV 71 (L) 80 - 100 fL    MCH 19.9 (L) 26.0 - 34.0 pg    MCHC 28.0 (L) 32.0 - 36.0 g/dL    RDW 18.5 (H) 11.5 - 14.5 %    Platelets 198 150 - 450 x10*3/uL    Neutrophils % 57.3 40.0 - 80.0 %    Immature Granulocytes %, Automated 0.9 0.0 - 0.9 %    Lymphocytes % 26.7 13.0 - 44.0 %    Monocytes % 14.0 2.0 - 10.0 %    Eosinophils % 0.7 0.0 - 6.0 %    Basophils % 0.4 0.0 - 2.0 %    Neutrophils Absolute 5.21 1.20 - 7.70 x10*3/uL    Immature Granulocytes Absolute, Automated 0.08 0.00 - 0.70 x10*3/uL    Lymphocytes Absolute 2.42 1.20 - 4.80 x10*3/uL    Monocytes Absolute 1.27 (H) 0.10 - 1.00 x10*3/uL    Eosinophils Absolute 0.06 0.00 - 0.70 x10*3/uL    Basophils Absolute 0.04 0.00 - 0.10 x10*3/uL   Comprehensive Metabolic Panel   Result Value Ref Range    Glucose 208 (H) 74 - 99 mg/dL    Sodium 134 (L) 136 - 145 mmol/L    Potassium 2.8 (LL) 3.5 - 5.3 mmol/L    Chloride 95 (L) 98 - 107 mmol/L    Bicarbonate 27 21 - 32 mmol/L    Anion Gap 15 10 - 20 mmol/L    Urea Nitrogen 30 (H) 6 - 23 mg/dL    Creatinine 2.19 (H) 0.50 - 1.30 mg/dL    eGFR 33 (L) >60 mL/min/1.73m*2    Calcium 8.7 8.6 - 10.3 mg/dL    Albumin 3.3 (L) 3.4 - 5.0 g/dL    Alkaline Phosphatase 115 33 - 136 U/L    Total Protein 6.2 (L) 6.4 - 8.2 g/dL    AST 61 (H) 9 - 39 U/L    Bilirubin, Total 1.0 0.0 - 1.2 mg/dL    ALT 42 10 - 52 U/L   Magnesium   Result Value Ref Range    Magnesium 2.37 1.60 - 2.40 mg/dL   Protime-INR   Result Value Ref Range    Protime 12.6 9.8 - 12.8 seconds    INR 1.1 0.9 - 1.1   Troponin I, High Sensitivity, Initial   Result Value Ref Range    Troponin I, High Sensitivity 59 (HH) 0 - 20 ng/L   B-type natriuretic peptide   Result Value Ref Range    BNP 97 0 - 99 pg/mL   BLOOD GAS ARTERIAL FULL PANEL   Result Value Ref Range    POCT pH, Arterial 7.54 (H) 7.38 - 7.42 pH     POCT pCO2, Arterial 31 (L) 38 - 42 mm Hg    POCT pO2, Arterial 137 (H) 85 - 95 mm Hg    POCT SO2, Arterial 100 94 - 100 %    POCT Oxy Hemoglobin, Arterial 97.4 94.0 - 98.0 %    POCT Hematocrit Calculated, Arterial 23.0 (L) 41.0 - 52.0 %    POCT Sodium, Arterial 130 (L) 136 - 145 mmol/L    POCT Potassium, Arterial 2.8 (LL) 3.5 - 5.3 mmol/L    POCT Chloride, Arterial 99 98 - 107 mmol/L    POCT Ionized Calcium, Arterial 1.11 1.10 - 1.33 mmol/L    POCT Glucose, Arterial 227 (H) 74 - 99 mg/dL    POCT Lactate, Arterial 3.9 (H) 0.4 - 2.0 mmol/L    POCT Base Excess, Arterial 3.8 (H) -2.0 - 3.0 mmol/L    POCT HCO3 Calculated, Arterial 26.5 (H) 22.0 - 26.0 mmol/L    POCT Hemoglobin, Arterial 7.6 (L) 13.5 - 17.5 g/dL    POCT Anion Gap, Arterial 7 (L) 10 - 25 mmo/L    Patient Temperature      FiO2 21 %    Apparatus CANNULA     Flow 6.0 LPM    Critical Called By GEOFF HE RRT     Critical Called To DR. RUBALCAVA     Critical Call Time 2208.0000     Critical Read Back Y    Blood Gas Venous Unsolicited   Result Value Ref Range    POCT pH, Venous 7.41 7.33 - 7.43 pH    POCT pCO2, Venous 44 41 - 51 mm Hg    POCT pO2, Venous 44 35 - 45 mm Hg    POCT SO2, Venous 66 45 - 75 %    POCT Oxy Hemoglobin, Venous 63.7 45.0 - 75.0 %    POCT Base Excess, Venous 2.7 -2.0 - 3.0 mmol/L    POCT HCO3 Calculated, Venous 27.9 (H) 22.0 - 26.0 mmol/L    Patient Temperature      Test Comment [id number]    Prepare RBC: 1 Units, Leukocytes Reduced (CMV reduced risk)   Result Value Ref Range    PRODUCT CODE [id number]     Unit Number [id number]     Unit ABO O     Unit RH POS     XM INTEP COMP     Dispense Status XM     Blood Expiration Date December 19, 2023 23:59 EST     PRODUCT BLOOD TYPE 5100     UNIT VOLUME 350    Troponin, High Sensitivity, 1 Hour   Result Value Ref Range    Troponin I, High Sensitivity 246 (HH) 0 - 20 ng/L   Type and screen   Result Value Ref Range    ABO TYPE O     Rh TYPE POS     ANTIBODY SCREEN NEG    TSH   Result Value Ref  Range    Thyroid Stimulating Hormone 0.04 (L) 0.44 - 3.98 mIU/L   POCT GLUCOSE   Result Value Ref Range    POCT Glucose 229 (H) 74 - 99 mg/dL   CBC   Result Value Ref Range    WBC 5.9 4.4 - 11.3 x10*3/uL    nRBC 0.0 0.0 - 0.0 /100 WBCs    RBC 3.21 (L) 4.50 - 5.90 x10*6/uL    Hemoglobin 6.3 (LL) 13.5 - 17.5 g/dL    Hematocrit 22.8 (L) 41.0 - 52.0 %    MCV 71 (L) 80 - 100 fL    MCH 19.6 (L) 26.0 - 34.0 pg    MCHC 27.6 (L) 32.0 - 36.0 g/dL    RDW 18.4 (H) 11.5 - 14.5 %    Platelets 152 150 - 450 x10*3/uL   Troponin I, High Sensitivity   Result Value Ref Range    Troponin I, High Sensitivity 454 (HH) 0 - 20 ng/L   Lactate   Result Value Ref Range    Lactate 1.0 0.4 - 2.0 mmol/L   VERIFY ABO/Rh Group Test   Result Value Ref Range    ABO TYPE O     Rh TYPE POS    Comprehensive metabolic panel   Result Value Ref Range    Glucose 235 (H) 74 - 99 mg/dL    Sodium 133 (L) 136 - 145 mmol/L    Potassium 3.4 (L) 3.5 - 5.3 mmol/L    Chloride 98 98 - 107 mmol/L    Bicarbonate 28 21 - 32 mmol/L    Anion Gap 10 10 - 20 mmol/L    Urea Nitrogen 31 (H) 6 - 23 mg/dL    Creatinine 2.00 (H) 0.50 - 1.30 mg/dL    eGFR 37 (L) >60 mL/min/1.73m*2    Calcium 8.1 (L) 8.6 - 10.3 mg/dL    Albumin 2.8 (L) 3.4 - 5.0 g/dL    Alkaline Phosphatase 93 33 - 136 U/L    Total Protein 5.2 (L) 6.4 - 8.2 g/dL    AST 47 (H) 9 - 39 U/L    Bilirubin, Total 0.9 0.0 - 1.2 mg/dL    ALT 35 10 - 52 U/L   Magnesium   Result Value Ref Range    Magnesium 2.22 1.60 - 2.40 mg/dL   Phosphorus   Result Value Ref Range    Phosphorus 3.8 2.5 - 4.9 mg/dL   Ammonia   Result Value Ref Range    Ammonia 35 16 - 53 umol/L     XR chest 1 view    Result Date: 11/21/2023  Interpreted By:  Reyes March, STUDY: XR CHEST 1 VIEW   INDICATION: Signs/Symptoms:cp.   COMPARISON: None   ACCESSION NUMBER(S): [id number]   ORDERING CLINICIAN: BANDAR YEN   FINDINGS: Cardiomegaly.   Likely small left pleural effusion.   No evidence of pneumothorax. No consolidation.       Likely small left  effusion.   Cardiomegaly.   Signed by: Reyes March 11/21/2023 10:38 PM Dictation workstation:   [id number]      Assessment/Plan   Principal Problem:    Cardiac arrest (CMS/HCC)  Active Problems:    STEMI (ST elevation myocardial infarction) (CMS/HCC)    Anemia in stage 3b chronic kidney disease (CMS/HCC)    Hypokalemia    Idiopathic hypotension    Iron deficiency    Stage 3 chronic kidney disease (CMS/HCC)    Dehydration    Plan  Neuro:  -A&Ox3, repetitive  -CTH/c-spine negative  -CTT/L spine ordered, patient with back pain with tenderness on palpation  -Ammonia 35  -CAM ICU assessment and ABCDEF bundle  -PT/OT    CV:  -HDS after 1L NS in ED  -Continuous cardiac telemetry and Q1 vitals  -Initial EKG sinus tachy, nonspecific ST depressions  -BNPep 97  -troponin 59, 246, trend (denies chest pain)  -LHC/RHC completed: patent coronaries and stents, no intervention, LV ~50%, low filling pressure consistent with dehydration  -PWP 6, PA 32/10 (20), RV 42/0/5, RA 5mmHg; LVeFP 9mmHg  -Cardiology following, recommend GDMT  -Continue home meds after med rec  -ECHO ordered  -Monitor and replace electrolytes as indicated    Pulm:  -No acute issues, on 3L NC  -CXR: small left pleural effusion  -Maintain pulse ox > 92%, wean O2 as tolerated  -IS    :  -BUN/Cr 31/2.00 (Cr 2.02 11/17)  -Monitor daily CMP  -Strict I/Os  -Intravenous hydration with LR at 125cc per hour  -K 2.8-> 3.4  -Monitor and replace electrolytes per protocol    GI:  -NPO until swallow evaluation  -GI prophylaxis: home PPI after med rec  -Bowel regimen: Colace  -Ammonia 35  -CTC/A/P pending, patient with drop in H, wife reports abdominal distention more than usual, patient with back pain    Endo:  -TSH 0.04, T4 pending  -SSI and Accuchecks Q4 while NPO  -Hypoglycemia protocol    Heme:  -H/H 7.8/27.9,  now Hgb 6.3  -Monitor daily CBC  -DVT Prophylaxis: SCDs, heparin (on hold, resume if no active bleeding)  -Transfuse for H < 8 with preexisting  cardiovascular disease   -2 units PRBC ordered    ID:  -afebrile, no leukocytosis  -Monitor for s/s of infection  -No indication for antimicrobial therapy at this time    Dispo: Admit to ICU for close hemodynamic monitoring while s/p cardiac arrest. Patient accepted at [facility] Main ICU, phone report given to Dr. Crespo, Dr. Salmeron accepted, awaiting bed for transfer, no bed available at [facility] where patient/spouse requested.          I spent 40 minutes in the professional and overall care of this patient.      Ameena Elizondo, APRN-CNP

## 2023-11-22 NOTE — PROGRESS NOTES
Physical Therapy    Physical Therapy Evaluation    Patient Name: Bob Edwards  MRN: [id number]  Today's Date: 11/22/2023   Time Calculation  Start Time: 1034  Stop Time: 1056  Time Calculation (min): 22 min    Assessment/Plan   PT Assessment  PT Assessment Results: Decreased strength, Decreased range of motion, Decreased endurance, Impaired balance, Decreased mobility, Decreased coordination, Decreased cognition, Impaired judgement, Decreased safety awareness, Impaired sensation  Rehab Prognosis: Good  Evaluation/Treatment Tolerance: Patient limited by fatigue  Medical Staff Made Aware: Yes  End of Session Communication: Bedside nurse  Assessment Comment: Pt's impairments inlcude generalized weakness, impaired balance, LUE edema, and decreased activity tolerance. Pt's functional limitaitons include bed mobility, transfers, gait and elevations. Pt would benefit from continued acute care PT during hospital LOS and upon discharge at a high level intensity. At end of PT eval, nursing reports pt now has a bed available at [facility] and pt will transfer.  End of Session Patient Position: Bed, 2 rail up, Alarm on  IP OR SWING BED PT PLAN  Inpatient or Swing Bed: Inpatient  PT Plan  Treatment/Interventions: Bed mobility, Transfer training, Gait training, Stair training, Balance training, Neuromuscular re-education, Strengthening, Endurance training, Range of motion, Therapeutic exercise, Therapeutic activity, Home exercise program, Positioning, Postural re-education  PT Plan: Skilled PT  PT Frequency: 5 times per week  PT Discharge Recommendations: High intensity level of continued care (However pt is to transfer to [facility]. Recomend follow up of PT.)  Equipment Recommended upon Discharge: Wheeled walker  PT Recommended Transfer Status: Assist x2, Assistive device  PT - OK to Discharge: Yes (Pt is to to discharge from acute care PT to next level of care once cleared from medical team.)      Subjective   General Visit  Information:  General  Reason for Referral: S/p cardiac arrest.  Referred By: Carmen Alexander MD  Past Medical History Relevant to Rehab: YEN cirrhosis, cardiac arrest 2/2023, CAD with multiple stents, HFpEF, L diabetic foot wound s/p debridement and skin graft, thyroidectomy c/b vocal cord paralysis.  Family/Caregiver Present: Yes  Caregiver Feedback: Assisted in providing home information but states pt is back to baseline for cognition.  Patient Position Received: Bed, 2 rail up  General Comment: Significant LUE edema observed that goes to pt's neck, multiple blisters to LUE observed. Nursing present. LUE elevated at end of PT assessment.  Home Living:  Home Living  Type of Home: House  Lives With: Spouse  Home Adaptive Equipment: Walker rolling or standard, Cane, Reacher  Home Layout: Two level, Able to live on main level with bedroom/bathroom (1 threshold EHSAN)  Bathroom Shower/Tub: Walk-in shower  Bathroom Equipment: Grab bars in shower  Prior Level of Function:  Prior Function Per Pt/Caregiver Report  Prior Function Comments: JOSE with cane for ambulation, boot to L foot. IND with bathing. Assist from spouse for lower body ADLs recently. Assist from spouse for IADLs and driving this year.  Precautions:  Precautions  Medical Precautions: Fall precautions  Vital Signs:  Vital Signs  Heart Rate: 91  SpO2: 99 % (5L supp O2 to NC.)  /53     Objective   Pain:  Pain Assessment  Pain Assessment: 0-10  Pain Score: 0 - No pain  Cognition:  Cognition  Orientation Level: Oriented X4    General Assessments:     Activity Tolerance  Endurance: Tolerates 10 - 20 min exercise with multiple rests    Static Sitting Balance  Static Sitting-Comment/Number of Minutes: SBA at EOB  Dynamic Sitting Balance  Dynamic Sitting-Comments: SBA to scoot and weight shift    Static Standing Balance  Static Standing-Comment/Number of Minutes: MinAx2 with B HHA  Dynamic Standing Balance  Dynamic Standing-Comments: MinAx2 with B  HHA.  Functional Assessments:  Bed Mobility  Bed Mobility: Yes  Bed Mobility 1  Bed Mobility 1: Supine to sitting, Sitting to supine  Level of Assistance 1: Moderate assistance (x2)  Bed Mobility Comments 1: Upon arrival, pt preparing to exit bed with spouse and nursing assistance to use urinal in standing. PT offerred assistance. Pt agreeable. BP not taken prior to mobility. Cues for sequencing. Pt assisted at BLEs and trunk.    Transfers  Transfer: Yes  Transfer 1  Technique 1: Sit to stand, Stand to sit  Transfer Device 1:  (B HHA.)  Transfer Level of Assistance 1: Moderate assistance (x2)  Trials/Comments 1: Verbal/tactile cues to weight shift fowards and momentum. Verbal cues to control descent to bed.    Ambulation/Gait Training  Ambulation/Gait Training Performed: Yes  Ambulation/Gait Training 1  Surface 1: Level tile  Device 1:  (B HHA)  Assistance 1: Moderate assistance (x2)  Comments/Distance (ft) 1: Pt took 2-3 steps to R with B HHA, ModAx2. Cues for sequencing steps, somewhat propulsive. Pt tolerated sitting at EOB x5 min after mobility, denied dizziness or lightheadedness.  Extremity/Trunk Assessments:  RLE   RLE : Exceptions to WFL  Strength RLE  RLE Overall Strength:  (Grossly 2/5 as observed during mobility.)  Strength LLE  LLE Overall Strength:  (Grossly 2/5 as observed during mobility.)  Outcome Measures:  [facility] Basic Mobility  Turning from your back to your side while in a flat bed without using bedrails: A little  Moving from lying on your back to sitting on the side of a flat bed without using bedrails: Total (2 person assist)  Moving to and from bed to chair (including a wheelchair): Total (2 person assist)  Standing up from a chair using your arms (e.g. wheelchair or bedside chair): Total (2 person assist)  To walk in hospital room: Total (Not performed.)  Climbing 3-5 steps with railing: Total (Not performed.)  Basic Mobility - Total Score: 8    Encounter Problems       Encounter Problems  (Active)       PT Problem       Bed mobility (Progressing)       Start:  11/22/23    Expected End:  12/06/23       Pt will perform supine<>sit with HOB flat, JOSE.           Transfers (Progressing)       Start:  11/22/23    Expected End:  12/06/23       Pt will perform all transfers with LRAD, JOSE.            Gait (Progressing)       Start:  11/22/23    Expected End:  12/06/23       Pt will amb 150' with LRAD, JOSE with reciprocal gait, upright posture and improved activity tolerance as demonstrated by vitals.           Balance (Progressing)       Start:  11/22/23    Expected End:  12/06/23       Pt will tolerate standing x2 min without BUE support, SBAx1.         Strength (Progressing)       Start:  11/22/23    Expected End:  12/06/23       Pt will improve gross BLE strength to 3+/5.                Education Documentation  Mobility Training, taught by Carina Loera, PT at 11/22/2023 12:58 PM.  Learner: Significant Other, Patient  Readiness: Acceptance  Method: Explanation, Demonstration  Response: Verbalizes Understanding, Demonstrated Understanding    Education Comments  No comments found.

## 2023-11-22 NOTE — CARE PLAN
Problem: Fall/Injury  Goal: Not fall by end of shift  Outcome: Adequate for Discharge  Goal: Be free from injury by end of the shift  Outcome: Adequate for Discharge  Goal: Use assistive devices by end of the shift  Outcome: Adequate for Discharge  Goal: Verbalize understanding of personal risk factors for fall in the hospital  Outcome: Adequate for Discharge  Goal: Verbalize understanding of risk factor reduction measures to prevent injury from fall in the home  Outcome: Adequate for Discharge  Goal: Pace activities to prevent fatigue by end of the shift  Outcome: Adequate for Discharge     Problem: Pain  Goal: My pain/discomfort is manageable  Outcome: Adequate for Discharge     Problem: Safety  Goal: I will remain free of falls  Outcome: Adequate for Discharge     Problem: Daily Care  Goal: Daily care needs are met  Outcome: Adequate for Discharge     Problem: Psychosocial Needs  Goal: Demonstrates ability to cope with hospitalization/illness  Outcome: Adequate for Discharge  Goal: Collaborate with me, my family, and caregiver to identify my specific goals  Outcome: Adequate for Discharge     Problem: Pain  Goal: Turns in bed with improved pain control throughout the shift  Outcome: Adequate for Discharge     Problem: Skin  Goal: Decreased wound size/increased tissue granulation at next dressing change  Outcome: Adequate for Discharge  Goal: Prevent/manage excess moisture  Outcome: Adequate for Discharge  Goal: Promote skin healing  Outcome: Adequate for Discharge  Goal: Participates in plan/prevention/treatment measures  Outcome: Adequate for Discharge  Goal: Prevent/minimize sheer/friction injuries  Outcome: Adequate for Discharge     Problem: Pain - Adult  Goal: Verbalizes/displays adequate comfort level or baseline comfort level  Outcome: Adequate for Discharge     Problem: Safety - Adult  Goal: Free from fall injury  Outcome: Adequate for Discharge     Problem: Discharge Planning  Goal: Discharge to home  or other facility with appropriate resources  Outcome: Adequate for Discharge     Problem: Chronic Conditions and Co-morbidities  Goal: Patient's chronic conditions and co-morbidity symptoms are monitored and maintained or improved  Outcome: Adequate for Discharge     Problem: Diabetes  Goal: Maintain glucose levels >70mg/dl to <250mg/dl throughout shift  Outcome: Adequate for Discharge   The patient's goals for the shift include      The clinical goals for the shift include hemodynamically stable    Patient received orders to transfer to [facility] this shift for continued care

## 2023-11-22 NOTE — POST-PROCEDURE NOTE
Physician Transition of Care Summary  Invasive Cardiovascular Lab    Procedure Date: 11/22/2023  Attending:    * Invasive Cardiologist Jena - Primary  Resident/Fellow/Other Assistant: Surgeon(s) and Role:    Indications:   Pre-op Diagnosis     * STEMI (ST elevation myocardial infarction) (CMS/HCC) [I21.3]    Post-procedure diagnosis:   Post-op Diagnosis     * STEMI (ST elevation myocardial infarction) (CMS/HCC) [I21.3]    Procedure(s):     * Left Heart Cath, No LV    * PCI    Chief complaint: Post Cardiac Arrest    HPI:   Bob Edwards is a 62 y.o. 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 He was admitted to ED [facility] on 11/21/2023 status post witnessed cardiac arrest. According to his wife Michelle, he was playing with his dog and his wife heard him fainting. She detected absence of pulse and immediately started CPR for ~3-5 min, and the patient got ROSC. Upon squad arrival, patient had recovered his consciousness, a little confused, but hemodynamically stable. Notably, patient denied any chest pain. EKG showing sinus tachycardia and no ST-T elevations, but non-specific ST-T depressions in hafsa-lateral wall. Patient was sent to ED and later to cath lab to rule out ischemia.    Impressions    # S/P Cardiac Arrest / Multivessel CAD  Patient underwent Right Heart Catheterization (RIJV) with [place]-Angela placement and Left Heart Catheterization (R radial), showing Left Coronary System Dominance (LCx), patent coronaries and patent stents to LAD and OM1. Preserved systolic LV function ~50%. Low right and left chamber filling pressures consistent  with dehydration. Preserved cardiac output. Patient remained stable throughout he procedure. Final /60mmHg.    - Patient loaded with ASA and Brilinta prior to the procedure.  - At this point, would suggest to keep GDMT.   - Patient's family wants him to be transferred to [facility], once they work there.    # Hypokalemia  - Chronic low potassium; today 2.8  - Replete potassium    # Hypotension  - Patient clinically dehydrated  - Would suggest for hydration.    Medications: No current outpatient medications      Allergies:   Allergies  Allergen Reactions   Penicillins Unknown        Physical Exam:  General: alert, confused, in no acute distress  HEENT: NC/AT; EOMI; PERRLA, external ear is normal  Neck: supple; trachea midline; no masses; no JVD  Chest: lungs clear to auscultation bilaterally; no wheezing  CVS: regular rhythm, S1S2 normal, no murmurs  Abdomen: Soft, non-tender, non-distension, no organomegaly  Extremities: no clubbing/cyanosis/edema. Cutaneous lesions LE.  Neuro: Grossly intact     Psychiatric: Confused      Procedure Findings:   Patent coronaries; patent stents    Description of the Procedure:   Procedure: Right and Left Heart Catheterization    Right Internal Jugular Vein access with 7F sheath. 7F balloon-tip [place]-Angela catheter inserted and advanced to the pulmonary artery. Right heart catheterization and hemodynamic measurements. Cardiac output and cardiac index assessed by Jair method. Catheter removed.  PWP 6, PA 32/10 (20), RV 42/0/5, RA 5mmHg; LVeFP 9mmHg    R radial artery access with 6F sheath. LV and Ao hemodynamic measurements. S/P LHC that showed left coronary artery system (LCx) dominance. Multivessel non-obstructive coronary artery disease. Patent stents to LAD and OM1. Non-dominant RCA. Patient remained stable during the entire procedure. No complications. Hemostasis with TR Band.    Plan: Bed rest in ICU for 4 hours. Constant check for bleeding. Maintain compression band (CB) for 60  minutes past procedure. Remove 3mL of air from CB every 15 minutes until fully deflated. If recurrent bleeding occurs, re-inflate with enough air to fully restore hemostasis (2 to 3 mL, maximum of 18 mL). Maintain CB at this same level for 30 minutes before attempting to re-deflate. Once fully deflated, carefully remove CB and place a sterile dressing over access site. Observe for 15 minutes and check for pulse and for signs of bleeding. Instruct patient not to use or bend their wrist for four hours.  Would suggest to keep GDMT at this point.    Complications:   None    Anticoagulation/Antiplatelet Plan:   Brilinta / ASA loaded prior to the procedure.    Estimated Blood Loss:   5 mL    Anesthesia: Moderate Sedation Anesthesia Staff: No anesthesia staff entered.    Any Specimen(s) Removed:   No specimens collected during this procedure.    Disposition:   ICU    Electronically signed by: Antoni Knight MD, 11/22/2023 12:01 AM

## 2023-11-23 ENCOUNTER — HOSPITAL ENCOUNTER (OUTPATIENT)
Dept: CARDIOLOGY | Facility: HOSPITAL | Age: 63
Discharge: HOME | End: 2023-11-23
Payer: COMMERCIAL

## 2023-11-23 LAB
ATRIAL RATE: 105 BPM
ATRIAL RATE: 114 BPM
P AXIS: 47 DEGREES
P AXIS: 65 DEGREES
P OFFSET: 181 MS
P OFFSET: 189 MS
P ONSET: 130 MS
P ONSET: 137 MS
PR INTERVAL: 152 MS
PR INTERVAL: 166 MS
Q ONSET: 213 MS
Q ONSET: 213 MS
QRS COUNT: 17 BEATS
QRS COUNT: 18 BEATS
QRS DURATION: 82 MS
QRS DURATION: 84 MS
QT INTERVAL: 364 MS
QT INTERVAL: 364 MS
QTC CALCULATION(BAZETT): 481 MS
QTC CALCULATION(BAZETT): 501 MS
QTC FREDERICIA: 438 MS
QTC FREDERICIA: 450 MS
R AXIS: -15 DEGREES
R AXIS: 1 DEGREES
T AXIS: 15 DEGREES
T AXIS: 70 DEGREES
T OFFSET: 395 MS
T OFFSET: 395 MS
VENTRICULAR RATE: 105 BPM
VENTRICULAR RATE: 114 BPM

## 2023-11-23 PROCEDURE — 93005 ELECTROCARDIOGRAM TRACING: CPT

## 2023-11-26 LAB
BLOOD EXPIRATION DATE: NORMAL
DISPENSE STATUS: NORMAL
PRODUCT BLOOD TYPE: 5100
PRODUCT CODE: NORMAL
UNIT ABO: NORMAL
UNIT NUMBER: NORMAL
UNIT RH: NORMAL
UNIT VOLUME: 350
XM INTEP: NORMAL

## (undated) DEVICE — MANIFOLD KIT, CUSTOM (SJM)

## (undated) DEVICE — INFLATION DEVICE, BASIXCOMPAX, 30 ATM/BAR, 20ML, MAP152

## (undated) DEVICE — CATHETER, OPTITORQUE, 5FR, TIG1, 1H/100CM

## (undated) DEVICE — Device

## (undated) DEVICE — MBRACE, WRIST SUPPORT WITH HOOK

## (undated) DEVICE — SUTURE, PROLENE, 2-0, 18 IN, FS, BLUE

## (undated) DEVICE — TR BAND, RADIAL COMPRESSION, STANDARD, 24CM

## (undated) DEVICE — SHEATH, PINNACLE, 10 CM,  7FR INTRODUCER, 7FR DIA, 2.5 CM DIALATOR

## (undated) DEVICE — ACCESS KIT, MINI MAK, 4 FR X 10CM, 0.018 X 40CM, NITINOL/PLATINUM, STD NEEDLE

## (undated) DEVICE — SUTURE, VICRYL 2-0, TAPER POINT, CT-1 UNDYED 27 INCH

## (undated) DEVICE — CATHETER, THERMODILUTION, SWAN GANZ, 7 FR, 110CM, STANDARD

## (undated) DEVICE — INTRODUCER SHEATH, GLIDESHEATH, 6FR 10CM